# Patient Record
Sex: MALE | Race: WHITE | NOT HISPANIC OR LATINO
[De-identification: names, ages, dates, MRNs, and addresses within clinical notes are randomized per-mention and may not be internally consistent; named-entity substitution may affect disease eponyms.]

---

## 2017-01-18 ENCOUNTER — APPOINTMENT (OUTPATIENT)
Dept: CARDIOLOGY | Facility: CLINIC | Age: 77
End: 2017-01-18

## 2017-02-07 ENCOUNTER — APPOINTMENT (OUTPATIENT)
Dept: CARDIOLOGY | Facility: CLINIC | Age: 77
End: 2017-02-07

## 2017-04-12 ENCOUNTER — APPOINTMENT (OUTPATIENT)
Dept: CARDIOLOGY | Facility: CLINIC | Age: 77
End: 2017-04-12

## 2017-04-19 ENCOUNTER — APPOINTMENT (OUTPATIENT)
Dept: CARDIOLOGY | Facility: CLINIC | Age: 77
End: 2017-04-19

## 2017-04-24 ENCOUNTER — APPOINTMENT (OUTPATIENT)
Dept: CARDIOLOGY | Facility: CLINIC | Age: 77
End: 2017-04-24

## 2017-05-08 ENCOUNTER — APPOINTMENT (OUTPATIENT)
Dept: CARDIOLOGY | Facility: CLINIC | Age: 77
End: 2017-05-08

## 2017-05-09 ENCOUNTER — APPOINTMENT (OUTPATIENT)
Dept: CARDIOLOGY | Facility: CLINIC | Age: 77
End: 2017-05-09

## 2017-08-14 ENCOUNTER — APPOINTMENT (OUTPATIENT)
Dept: CARDIOLOGY | Facility: CLINIC | Age: 77
End: 2017-08-14
Payer: MEDICARE

## 2017-08-14 PROCEDURE — 93289 INTERROG DEVICE EVAL HEART: CPT

## 2017-09-28 ENCOUNTER — APPOINTMENT (OUTPATIENT)
Dept: CARDIOLOGY | Facility: CLINIC | Age: 77
End: 2017-09-28
Payer: MEDICARE

## 2017-09-28 PROCEDURE — 93880 EXTRACRANIAL BILAT STUDY: CPT

## 2017-09-28 PROCEDURE — 93306 TTE W/DOPPLER COMPLETE: CPT

## 2017-10-10 ENCOUNTER — APPOINTMENT (OUTPATIENT)
Dept: CARDIOLOGY | Facility: CLINIC | Age: 77
End: 2017-10-10
Payer: MEDICARE

## 2017-10-10 ENCOUNTER — RECORD ABSTRACTING (OUTPATIENT)
Age: 77
End: 2017-10-10

## 2017-10-10 DIAGNOSIS — Z78.9 OTHER SPECIFIED HEALTH STATUS: ICD-10-CM

## 2017-10-10 DIAGNOSIS — Z81.8 FAMILY HISTORY OF OTHER MENTAL AND BEHAVIORAL DISORDERS: ICD-10-CM

## 2017-10-10 DIAGNOSIS — Z86.19 PERSONAL HISTORY OF OTHER INFECTIOUS AND PARASITIC DISEASES: ICD-10-CM

## 2017-10-10 DIAGNOSIS — Z82.49 FAMILY HISTORY OF ISCHEMIC HEART DISEASE AND OTHER DISEASES OF THE CIRCULATORY SYSTEM: ICD-10-CM

## 2017-10-10 PROCEDURE — 93000 ELECTROCARDIOGRAM COMPLETE: CPT

## 2017-10-10 PROCEDURE — 99214 OFFICE O/P EST MOD 30 MIN: CPT

## 2017-10-10 RX ORDER — DIPHENHYDRAMINE HCL 25 MG
25 CAPSULE ORAL
Refills: 0 | Status: ACTIVE | COMMUNITY

## 2017-10-10 RX ORDER — ATORVASTATIN CALCIUM 40 MG/1
40 TABLET, FILM COATED ORAL DAILY
Refills: 0 | Status: ACTIVE | COMMUNITY

## 2017-10-10 RX ORDER — ASPIRIN ENTERIC COATED TABLETS 81 MG 81 MG/1
81 TABLET, DELAYED RELEASE ORAL DAILY
Refills: 0 | Status: ACTIVE | COMMUNITY

## 2017-10-10 RX ORDER — CARVEDILOL 3.12 MG/1
3.12 TABLET, FILM COATED ORAL TWICE DAILY
Refills: 0 | Status: ACTIVE | COMMUNITY

## 2017-11-16 ENCOUNTER — APPOINTMENT (OUTPATIENT)
Dept: CARDIOLOGY | Facility: CLINIC | Age: 77
End: 2017-11-16
Payer: MEDICARE

## 2017-11-16 PROCEDURE — 93282 PRGRMG EVAL IMPLANTABLE DFB: CPT

## 2018-03-12 ENCOUNTER — APPOINTMENT (OUTPATIENT)
Dept: CARDIOLOGY | Facility: CLINIC | Age: 78
End: 2018-03-12
Payer: MEDICARE

## 2018-03-12 PROCEDURE — 93282 PRGRMG EVAL IMPLANTABLE DFB: CPT

## 2018-05-24 ENCOUNTER — RECORD ABSTRACTING (OUTPATIENT)
Age: 78
End: 2018-05-24

## 2018-05-24 RX ORDER — BUDESONIDE AND FORMOTEROL FUMARATE DIHYDRATE 160; 4.5 UG/1; UG/1
160-4.5 AEROSOL RESPIRATORY (INHALATION)
Refills: 0 | Status: COMPLETED | COMMUNITY

## 2018-05-24 RX ORDER — ALBUTEROL 90 MCG
90 AEROSOL (GRAM) INHALATION
Refills: 0 | Status: COMPLETED | COMMUNITY

## 2018-06-12 ENCOUNTER — RECORD ABSTRACTING (OUTPATIENT)
Age: 78
End: 2018-06-12

## 2018-06-12 ENCOUNTER — APPOINTMENT (OUTPATIENT)
Dept: CARDIOLOGY | Facility: CLINIC | Age: 78
End: 2018-06-12
Payer: MEDICARE

## 2018-06-12 VITALS
WEIGHT: 142 LBS | HEIGHT: 70 IN | SYSTOLIC BLOOD PRESSURE: 120 MMHG | OXYGEN SATURATION: 96 % | HEART RATE: 82 BPM | DIASTOLIC BLOOD PRESSURE: 64 MMHG | BODY MASS INDEX: 20.33 KG/M2

## 2018-06-12 PROCEDURE — 99214 OFFICE O/P EST MOD 30 MIN: CPT

## 2018-06-19 ENCOUNTER — APPOINTMENT (OUTPATIENT)
Dept: CARDIOLOGY | Facility: CLINIC | Age: 78
End: 2018-06-19
Payer: MEDICARE

## 2018-06-19 PROCEDURE — 93282 PRGRMG EVAL IMPLANTABLE DFB: CPT

## 2018-09-25 ENCOUNTER — APPOINTMENT (OUTPATIENT)
Dept: CARDIOLOGY | Facility: CLINIC | Age: 78
End: 2018-09-25
Payer: MEDICARE

## 2018-09-25 PROCEDURE — 93282 PRGRMG EVAL IMPLANTABLE DFB: CPT

## 2018-10-11 ENCOUNTER — APPOINTMENT (OUTPATIENT)
Dept: CARDIOLOGY | Facility: CLINIC | Age: 78
End: 2018-10-11
Payer: MEDICARE

## 2018-10-11 PROCEDURE — 93880 EXTRACRANIAL BILAT STUDY: CPT

## 2018-10-11 PROCEDURE — 93306 TTE W/DOPPLER COMPLETE: CPT

## 2018-10-19 ENCOUNTER — RECORD ABSTRACTING (OUTPATIENT)
Age: 78
End: 2018-10-19

## 2018-10-23 ENCOUNTER — APPOINTMENT (OUTPATIENT)
Dept: CARDIOLOGY | Facility: CLINIC | Age: 78
End: 2018-10-23
Payer: MEDICARE

## 2018-10-23 VITALS
HEIGHT: 70 IN | WEIGHT: 142 LBS | BODY MASS INDEX: 20.33 KG/M2 | DIASTOLIC BLOOD PRESSURE: 70 MMHG | HEART RATE: 90 BPM | SYSTOLIC BLOOD PRESSURE: 130 MMHG | OXYGEN SATURATION: 97 %

## 2018-10-23 DIAGNOSIS — Z00.00 ENCOUNTER FOR GENERAL ADULT MEDICAL EXAMINATION W/OUT ABNORMAL FINDINGS: ICD-10-CM

## 2018-10-23 PROCEDURE — 93000 ELECTROCARDIOGRAM COMPLETE: CPT

## 2018-10-23 PROCEDURE — 99214 OFFICE O/P EST MOD 30 MIN: CPT

## 2019-01-08 ENCOUNTER — APPOINTMENT (OUTPATIENT)
Dept: CARDIOLOGY | Facility: CLINIC | Age: 79
End: 2019-01-08
Payer: MEDICARE

## 2019-01-08 PROCEDURE — 93282 PRGRMG EVAL IMPLANTABLE DFB: CPT

## 2019-01-08 NOTE — PROCEDURE
[No] : not [ICD] : Implantable cardioverter-defibrillator [VVI] : VVI [Voltage: ___ volts] : Voltage was [unfilled] volts [Charge Time: ___ sec] : charge time was [unfilled] seconds [Threshold Testing Performed] : Threshold testing was performed [Lead Imp:  ___ohms] : lead impedance was [unfilled] ohms [Sensing Amplitude ___mv] : sensing amplitude was [unfilled] mv [___V @] : [unfilled] V [___ ms] : [unfilled] ms [None] : none [de-identified] : Medtronic [de-identified] : Donte GREEN VR [id number] [de-identified] : [id number] [de-identified] : 2- [de-identified] : 40 [de-identified] :  <0.1%\par \par Settings\par ZONES:\par VF: 182bpm   Therapy:  aTP with charge, 35J x 6\par Fast VT: 240bpm   Therapy: Bust (1), 35J x 5\par VT 167bpm  Therapy: monitor\par \par Ventricle sensing 0.30mv\par                amplitude 2.0v (auto)\par                 duration 0.40ms (auto\par \par no events were noted\par \par f/u AICD interrogation due in 3 months\par \par Red flag symptoms which would warrant sooner emergent evaluation reviewed with the patient. \par Questions and concerns were addressed and answered. \par \par Sincerely,\par \par Justyna Montoya PA-C\par Supervising MD: Vilma Galindo

## 2019-03-18 ENCOUNTER — RECORD ABSTRACTING (OUTPATIENT)
Age: 79
End: 2019-03-18

## 2019-03-26 ENCOUNTER — APPOINTMENT (OUTPATIENT)
Dept: CARDIOLOGY | Facility: CLINIC | Age: 79
End: 2019-03-26
Payer: MEDICARE

## 2019-03-26 VITALS
DIASTOLIC BLOOD PRESSURE: 70 MMHG | WEIGHT: 144 LBS | BODY MASS INDEX: 20.62 KG/M2 | OXYGEN SATURATION: 98 % | HEART RATE: 69 BPM | SYSTOLIC BLOOD PRESSURE: 110 MMHG | HEIGHT: 70 IN

## 2019-03-26 PROCEDURE — 99214 OFFICE O/P EST MOD 30 MIN: CPT

## 2019-04-09 ENCOUNTER — APPOINTMENT (OUTPATIENT)
Dept: CARDIOLOGY | Facility: CLINIC | Age: 79
End: 2019-04-09
Payer: MEDICARE

## 2019-04-09 VITALS
WEIGHT: 142 LBS | DIASTOLIC BLOOD PRESSURE: 74 MMHG | BODY MASS INDEX: 19.88 KG/M2 | SYSTOLIC BLOOD PRESSURE: 122 MMHG | OXYGEN SATURATION: 97 % | HEART RATE: 73 BPM | HEIGHT: 71 IN

## 2019-04-09 PROCEDURE — 93282 PRGRMG EVAL IMPLANTABLE DFB: CPT

## 2019-04-09 NOTE — PROCEDURE
[No] : not [ICD] : Implantable cardioverter-defibrillator [VVI] : VVI [Voltage: ___ volts] : Voltage was [unfilled] volts [Charge Time: ___ sec] : charge time was [unfilled] seconds [Threshold Testing Performed] : Threshold testing was performed [Lead Imp:  ___ohms] : lead impedance was [unfilled] ohms [Sensing Amplitude ___mv] : sensing amplitude was [unfilled] mv [___V @] : [unfilled] V [___ ms] : [unfilled] ms [None] : none [de-identified] : [id number] [de-identified] : Medtronic [de-identified] : Donte GREEN VR [id number] [de-identified] : 40 [de-identified] : 2- [de-identified] :  <0.1%\par \par Settings\par ZONES:\par VF: 182bpm   Therapy:  aTP with charge, 35J x 6\par Fast VT: 240bpm   Therapy: Bust (1), 35J x 5\par VT 167bpm  Therapy: monitor\par \par Ventricle sensing 0.30mv\par                amplitude 2.0v (auto)\par                 duration 0.40ms (auto\par \par 1 HVR, short duration, on Coreg 3.125 BID (previously taking QD), EF 45-50% October 2018\par Labs March 2019\par \par f/u AICD interrogation due in 3 months\par \par Red flag symptoms which would warrant sooner emergent evaluation reviewed with the patient. \par Questions and concerns were addressed and answered. \par \par Sincerely,\par \par Justyna Montoya PA-C\par Supervising MD: Vilma Galindo

## 2019-07-09 ENCOUNTER — APPOINTMENT (OUTPATIENT)
Dept: CARDIOLOGY | Facility: CLINIC | Age: 79
End: 2019-07-09
Payer: MEDICARE

## 2019-07-09 PROCEDURE — 93282 PRGRMG EVAL IMPLANTABLE DFB: CPT

## 2019-07-09 NOTE — PROCEDURE
[No] : not [ICD] : Implantable cardioverter-defibrillator [VVI] : VVI [Voltage: ___ volts] : Voltage was [unfilled] volts [Charge Time: ___ sec] : charge time was [unfilled] seconds [Lead Imp:  ___ohms] : lead impedance was [unfilled] ohms [Threshold Testing Performed] : Threshold testing was performed [Sensing Amplitude ___mv] : sensing amplitude was [unfilled] mv [___V @] : [unfilled] V [___ ms] : [unfilled] ms [None] : none [de-identified] : Medtronic [de-identified] : Donte GREEN VR [id number] [de-identified] : [id number] [de-identified] : 2- [de-identified] : 40 [de-identified] :  <0.1%\par \par Settings\par ZONES:\par VF: 182bpm   Therapy:  aTP with charge, 35J x 6\par Fast VT: 240bpm   Therapy: Bust (1), 35J x 5\par VT 167bpm  Therapy: monitor\par \par Ventricle sensing 0.30mv\par                amplitude 2.0v (auto)\par                 duration 0.40ms (auto\par \par \par f/u AICD interrogation due in 3 months\par \par Red flag symptoms which would warrant sooner emergent evaluation reviewed with the patient. \par Questions and concerns were addressed and answered. \par \par Sincerely,\par \par Justyna Montoya PA-C\par Supervising MD: Vilma Galindo

## 2019-10-08 ENCOUNTER — APPOINTMENT (OUTPATIENT)
Dept: CARDIOLOGY | Facility: CLINIC | Age: 79
End: 2019-10-08
Payer: MEDICARE

## 2019-10-08 PROCEDURE — 93282 PRGRMG EVAL IMPLANTABLE DFB: CPT

## 2019-10-08 NOTE — PROCEDURE
[No] : not [ICD] : Implantable cardioverter-defibrillator [VVI] : VVI [Voltage: ___ volts] : Voltage was [unfilled] volts [Charge Time: ___ sec] : charge time was [unfilled] seconds [Lead Imp:  ___ohms] : lead impedance was [unfilled] ohms [Threshold Testing Performed] : Threshold testing was performed [Sensing Amplitude ___mv] : sensing amplitude was [unfilled] mv [___V @] : [unfilled] V [None] : none [___ ms] : [unfilled] ms [de-identified] : Medtronic [de-identified] : Donte GREEN VR [id number] [de-identified] : [id number] [de-identified] : 2- [de-identified] : 40 [de-identified] :  <0.1%\par \par Settings\par ZONES:\par VF: 182bpm   Therapy:  aTP with charge, 35J x 6\par Fast VT: 240bpm   Therapy: Bust (1), 35J x 5\par VT 167bpm  Therapy: monitor\par \par Ventricle sensing 0.30mv\par                amplitude 2.0v (auto)\par                 duration 0.40ms (auto\par \par \par f/u AICD interrogation due in 3 months\par \par Red flag symptoms which would warrant sooner emergent evaluation reviewed with the patient. \par Questions and concerns were addressed and answered. \par \par Sincerely,\par \par Justyna Montoya PA-C\par Supervising MD: Vilma Galindo

## 2019-10-10 ENCOUNTER — NON-APPOINTMENT (OUTPATIENT)
Age: 79
End: 2019-10-10

## 2019-10-10 ENCOUNTER — APPOINTMENT (OUTPATIENT)
Dept: CARDIOLOGY | Facility: CLINIC | Age: 79
End: 2019-10-10
Payer: MEDICARE

## 2019-10-10 VITALS
HEIGHT: 71 IN | HEART RATE: 73 BPM | SYSTOLIC BLOOD PRESSURE: 112 MMHG | WEIGHT: 136 LBS | BODY MASS INDEX: 19.04 KG/M2 | OXYGEN SATURATION: 97 % | DIASTOLIC BLOOD PRESSURE: 74 MMHG

## 2019-10-10 DIAGNOSIS — I42.5 OTHER RESTRICTIVE CARDIOMYOPATHY: ICD-10-CM

## 2019-10-10 DIAGNOSIS — I21.09 ST ELEVATION (STEMI) MYOCARDIAL INFARCTION INVOLVING OTHER CORONARY ARTERY OF ANTERIOR WALL: ICD-10-CM

## 2019-10-10 PROCEDURE — 99214 OFFICE O/P EST MOD 30 MIN: CPT

## 2019-10-10 PROCEDURE — 93000 ELECTROCARDIOGRAM COMPLETE: CPT

## 2019-10-10 NOTE — PHYSICAL EXAM
[Normal Appearance] : normal appearance [No Deformities] : no deformities [Eyelids - No Xanthelasma] : the eyelids demonstrated no xanthelasmas [No Oral Pallor] : no oral pallor [Normal Jugular Venous V Waves Present] : normal jugular venous V waves present [Respiration, Rhythm And Depth] : normal respiratory rhythm and effort [Heart Rate And Rhythm] : heart rate and rhythm were normal [Heart Sounds] : normal S1 and S2 [Edema] : no peripheral edema present [Bowel Sounds] : normal bowel sounds [Abdomen Soft] : soft [Abdomen Tenderness] : non-tender [Abnormal Walk] : normal gait [Petechial Hemorrhages (___cm)] : no petechial hemorrhages [] : no rash [Oriented To Time, Place, And Person] : oriented to person, place, and time

## 2019-10-10 NOTE — DISCUSSION/SUMMARY
[FreeTextEntry1] : CAD: Currently stable on medications. Patient on statin, aspirin and beta blockers.\par \par Cardiomyopathy, probably ischemic. Borderline low LVEF. This has been stable. No CHF signs or symptoms. Due to high creatinine, patient unable to take ACE I or ARB. Sometimes, he has postural dizziness. Borderline blood pressures for his age. Unable to give hydralazine. Followup LVEF and PASP in future.\par \par Screen for AAA Given his age and history of CAD. He denies claudication\par \par Status post AICD. Continue device check. No episodes of PAF or significant ventricular arrhythmias\par \par Hypercholesteremia, well controlled with statins\par \par Patient encouraged to stay active, CT low-level exercise. Avoid high sodium diet\par \par lexiscan stress test with SPECT perfusion imaging in future should be considered. The patient is reluctant for this.\par \par \par Sincerely,\par Arnulfo Cobb MD, FACC, ALEXANDRA

## 2019-10-10 NOTE — HISTORY OF PRESENT ILLNESS
[FreeTextEntry1] : Stu is a pleasant 79-year-old male with history of cardiomyopathy, LVEF 45-50% on echocardiogram in October 2018, CAD, status post STEMI in 2010 and another MARISOL to LAD and PTCA of D1 in  2013, status post AICD at [place]\par \par Also hypercholesteremia, Hypertension, improved with medications\par \par he denies PND, orthopnea. He has much shortness of breath on exertion which has been stable over the past few years. No CHF exacerbation her hospital admission. No TIA, CVA or syncope. He denies chest pain.\par \par

## 2020-05-11 ENCOUNTER — APPOINTMENT (OUTPATIENT)
Dept: CARDIOLOGY | Facility: CLINIC | Age: 80
End: 2020-05-11

## 2020-07-20 ENCOUNTER — APPOINTMENT (OUTPATIENT)
Dept: CARDIOLOGY | Facility: CLINIC | Age: 80
End: 2020-07-20
Payer: MEDICARE

## 2020-07-20 PROCEDURE — 93979 VASCULAR STUDY: CPT

## 2020-07-20 PROCEDURE — 93282 PRGRMG EVAL IMPLANTABLE DFB: CPT

## 2020-07-20 PROCEDURE — 93306 TTE W/DOPPLER COMPLETE: CPT

## 2020-07-21 NOTE — PROCEDURE
[No] : not [See Scanned Paceart Report] : See scanned paceart report [ICD] : Implantable cardioverter-defibrillator [Voltage: ___ volts] : Voltage was [unfilled] volts [VVI] : VVI [Lead Imp:  ___ohms] : lead impedance was [unfilled] ohms [Threshold Testing Performed] : Threshold testing was performed [Charge Time: ___ sec] : charge time was [unfilled] seconds [___V @] : [unfilled] V [Sensing Amplitude ___mv] : sensing amplitude was [unfilled] mv [___ ms] : [unfilled] ms [None] : none [de-identified] : Medtronic [de-identified] : Donte GREEN VR [id number] [de-identified] : [id number] [de-identified] : 2- [de-identified] : 40 [de-identified] :  <0.1%\par \par Settings\par \par \par Ventricle sensing 0.30mv\par                amplitude 2.0v (auto)\par                 duration 0.40ms (auto\par \par no events were noted\par \par f/u AICD interrogation due in 3 months\par \par Red flag symptoms which would warrant sooner emergent evaluation reviewed with the patient. \par Questions and concerns were addressed and answered. \par \par \par Sincerely,\par \par Justyna Montoya PA-C\par Supervising MD: Vilma Galindo

## 2020-10-18 ENCOUNTER — RESULT CHARGE (OUTPATIENT)
Age: 80
End: 2020-10-18

## 2020-10-19 ENCOUNTER — APPOINTMENT (OUTPATIENT)
Dept: CARDIOLOGY | Facility: CLINIC | Age: 80
End: 2020-10-19
Payer: MEDICARE

## 2020-10-19 VITALS
DIASTOLIC BLOOD PRESSURE: 74 MMHG | HEIGHT: 71 IN | HEART RATE: 71 BPM | SYSTOLIC BLOOD PRESSURE: 130 MMHG | BODY MASS INDEX: 19.04 KG/M2 | TEMPERATURE: 98.7 F | WEIGHT: 136 LBS | OXYGEN SATURATION: 98 %

## 2020-10-19 PROCEDURE — 99214 OFFICE O/P EST MOD 30 MIN: CPT

## 2020-10-19 PROCEDURE — 93282 PRGRMG EVAL IMPLANTABLE DFB: CPT

## 2020-10-19 RX ORDER — ALBUTEROL SULFATE 90 UG/1
108 (90 BASE) INHALANT RESPIRATORY (INHALATION)
Qty: 8 | Refills: 0 | Status: ACTIVE | COMMUNITY
Start: 2020-09-22

## 2020-10-19 NOTE — DISCUSSION/SUMMARY
[FreeTextEntry1] : CAD: Currently stable on medications. Patient on statin, aspirin and beta blockers.\par \par Cardiomyopathy -VF has improved. No CHF signs or symptoms. Due to high creatinine, patient unable to take ACE I or ARB. Sometimes, he has postural dizziness.  Trial of low-dose hydralazine can be attempted the LVEF is less than 50%\par \par Status post AICD. Continue device check. No episodes of PAF or significant ventricular arrhythmias\par \par Hypercholesteremia, well controlled with statins\par \par Patient encouraged to stay active, CT low-level exercise. Avoid high sodium diet\par \par lexiscan stress test with SPECT perfusion imaging in future should be considered. The patient is reluctant for this.\par \par Follow-up AICD as per routine protocol.  Follow-up in the office with me in 1 year.\par \par \par Sincerely,\par Arnulfo Cobb MD, FACC, ALEXANDRA

## 2020-10-19 NOTE — PROCEDURE
[No] : not [See Scanned Paceart Report] : See scanned paceart report [VVI] : VVI [ICD] : Implantable cardioverter-defibrillator [Voltage: ___ volts] : Voltage was [unfilled] volts [Threshold Testing Performed] : Threshold testing was performed [Charge Time: ___ sec] : charge time was [unfilled] seconds [Sensing Amplitude ___mv] : sensing amplitude was [unfilled] mv [Lead Imp:  ___ohms] : lead impedance was [unfilled] ohms [___V @] : [unfilled] V [___ ms] : [unfilled] ms [None] : none [de-identified] : Medtronic [de-identified] : Donte GREEN VR [id number] [de-identified] : [id number] [de-identified] : 2- [de-identified] : 40 [de-identified] :  <0.1%\par \par Settings\par \par \par Ventricle sensing 0.30mv\par                amplitude 2.0v (auto)\par                 duration 0.40ms (auto\par \par no events were noted\par \par f/u AICD interrogation due in 3 months\par \par Red flag symptoms which would warrant sooner emergent evaluation reviewed with the patient. \par Questions and concerns were addressed and answered. \par \par \par Sincerely,\par \par Justyna Montoya PA-C\par Supervising MD: Arnulfo Cobb

## 2020-10-19 NOTE — PHYSICAL EXAM
[Normal Appearance] : normal appearance [No Deformities] : no deformities [Eyelids - No Xanthelasma] : the eyelids demonstrated no xanthelasmas [No Oral Pallor] : no oral pallor [Normal Jugular Venous V Waves Present] : normal jugular venous V waves present [Respiration, Rhythm And Depth] : normal respiratory rhythm and effort [Heart Rate And Rhythm] : heart rate and rhythm were normal [FreeTextEntry1] : 2/6 systolic ejection murmur [Heart Sounds] : normal S1 and S2 [Edema] : no peripheral edema present [Bowel Sounds] : normal bowel sounds [Abdomen Soft] : soft [Abdomen Tenderness] : non-tender [Petechial Hemorrhages (___cm)] : no petechial hemorrhages [Abnormal Walk] : normal gait [] : no rash [Oriented To Time, Place, And Person] : oriented to person, place, and time

## 2020-10-19 NOTE — HISTORY OF PRESENT ILLNESS
[FreeTextEntry1] : Stu is a pleasant 79-year-old male with history of cardiomyopathy, LVEF 45-50% on echocardiogram in October 2018, CAD, status post STEMI in 2010 and another MARISOL to LAD and PTCA of D1 in  2013, status post AICD at [place]\par \par Also hypercholesteremia, Hypertension, improved with medications\par \par he denies PND, orthopnea. He has much shortness of breath on exertion which has been stable over the past few years. No CHF exacerbation her hospital admission. No TIA, CVA or syncope. He denies chest pain.\par \par His echocardiogram in July 2020 showed normal EF, mild aortic stenosis.  Normal PASP.

## 2021-04-05 ENCOUNTER — APPOINTMENT (OUTPATIENT)
Dept: CARDIOLOGY | Facility: CLINIC | Age: 81
End: 2021-04-05

## 2021-04-16 ENCOUNTER — APPOINTMENT (OUTPATIENT)
Dept: CARDIOLOGY | Facility: CLINIC | Age: 81
End: 2021-04-16
Payer: MEDICARE

## 2021-04-16 ENCOUNTER — NON-APPOINTMENT (OUTPATIENT)
Age: 81
End: 2021-04-16

## 2021-04-16 PROCEDURE — 93282 PRGRMG EVAL IMPLANTABLE DFB: CPT

## 2021-04-16 NOTE — PROCEDURE
[No] : not [ICD] : Implantable cardioverter-defibrillator [VVI] : VVI [Voltage: ___ volts] : Voltage was [unfilled] volts [Lead Imp:  ___ohms] : lead impedance was [unfilled] ohms [Sensing Amplitude ___mv] : sensing amplitude was [unfilled] mv [___V @] : [unfilled] V [___ ms] : [unfilled] ms [de-identified] : Medtronic [de-identified] : Donte GREEN VR [id number] [de-identified] : [id number] [de-identified] : 2- [de-identified] : 40 [de-identified] : B [de-identified] : VS 99.9%\par  0.1%\par \par No events.\par \par \par Settings\par \par \par Ventricle sensing 0.30mv\par  amplitude 2.0v (auto)\par  duration 0.40ms (auto)\par \par Detection:\par VF, On, rate 182 bpm, Therapies ATP during charging, 35J x 6\par FVT, via VF, rate 240 bpm, Therapies burse (1), 35J x 6\par VT, off, rate 167 bpm, All Rx off\par \par Patient will hit RRT at 2.63 volts. Close monitoring recommended. Declines remote monitoring. Will repeat DVC in 3 months.\par \par F/U: as above.\par Discussed red flag symptoms, which would warrant sooner or emergent medical evaluation.\par Any questions and concerns were addressed and resolved.\par \par Sincerely,\par Hannah Davidson FNP-BC\par Patient's history, testing, and plan was reviewed with supervising physician, Dr. Arnulfo Cobb

## 2021-07-09 ENCOUNTER — APPOINTMENT (OUTPATIENT)
Dept: CARDIOLOGY | Facility: CLINIC | Age: 81
End: 2021-07-09
Payer: MEDICARE

## 2021-07-09 VITALS
SYSTOLIC BLOOD PRESSURE: 124 MMHG | BODY MASS INDEX: 19.82 KG/M2 | WEIGHT: 140 LBS | OXYGEN SATURATION: 97 % | DIASTOLIC BLOOD PRESSURE: 68 MMHG | HEART RATE: 65 BPM | HEIGHT: 70.5 IN | TEMPERATURE: 97.8 F

## 2021-07-09 PROCEDURE — 99214 OFFICE O/P EST MOD 30 MIN: CPT

## 2021-07-09 NOTE — PROCEDURE
[No] : not [ICD] : Implantable cardioverter-defibrillator [VVI] : VVI [___ ms] : [unfilled] ms [Voltage: ___ volts] : Voltage was [unfilled] volts [Charge Time: ___ sec] : charge time was [unfilled] seconds [Lead Imp:  ___ohms] : lead impedance was [unfilled] ohms [Sensing Amplitude ___mv] : sensing amplitude was [unfilled] mv [___V @] : [unfilled] V [de-identified] : Medtronic [de-identified] : Donte GREEN VR [id number] [de-identified] : [id number] [de-identified] : 2- [de-identified] : 40 [de-identified] : %\par  <0.1%\par \par No events.\par \par \par Settings\par \par \par Ventricle sensing 0.30mv\par  amplitude 2.0v (auto)\par  duration 0.40ms (auto)\par \par Detection:\par VF, On, rate 182 bpm, Therapies ATP during charging, 35J x 6\par FVT, via VF, rate 240 bpm, Therapies burst (1), 35J x 5\par VT, off, rate 167 bpm, All Rx off\par \par Patient will hit RRT at 2.63 volts. Close monitoring recommended. Declines remote monitoring. Will repeat DVC in 3 months.\par \par Patient has OV today as well.\par \par Device and lead have been confirmed in Paceart 7/9/21.\par \par F/U: as above.\par Discussed red flag symptoms, which would warrant sooner or emergent medical evaluation.\par Any questions and concerns were addressed and resolved.\par \par Sincerely,\par Hannah Davidson [facility]-BC\par Patient's history, testing, and plan was reviewed with supervising physician, Dr. Arnulfo Cobb

## 2021-07-09 NOTE — PHYSICAL EXAM
[Well Developed] : well developed [Well Nourished] : well nourished [No Acute Distress] : no acute distress [Normal Conjunctiva] : normal conjunctiva [Normal Venous Pressure] : normal venous pressure [No Carotid Bruit] : no carotid bruit [Normal S1, S2] : normal S1, S2 [No Murmur] : no murmur [No Rub] : no rub [No Gallop] : no gallop [Clear Lung Fields] : clear lung fields [Good Air Entry] : good air entry [No Respiratory Distress] : no respiratory distress  [Soft] : abdomen soft [Non Tender] : non-tender [No Masses/organomegaly] : no masses/organomegaly [Normal Bowel Sounds] : normal bowel sounds [Normal Gait] : normal gait [No Edema] : no edema [No Cyanosis] : no cyanosis [No Clubbing] : no clubbing [No Varicosities] : no varicosities [No Rash] : no rash [No Skin Lesions] : no skin lesions [Moves all extremities] : moves all extremities [No Focal Deficits] : no focal deficits [Normal Speech] : normal speech [Alert and Oriented] : alert and oriented [Normal memory] : normal memory [Normal Appearance] : normal appearance [No Deformities] : no deformities [Eyelids - No Xanthelasma] : the eyelids demonstrated no xanthelasmas [No Oral Pallor] : no oral pallor [Normal Jugular Venous V Waves Present] : normal jugular venous V waves present [Respiration, Rhythm And Depth] : normal respiratory rhythm and effort [Heart Rate And Rhythm] : heart rate and rhythm were normal [Heart Sounds] : normal S1 and S2 [Edema] : no peripheral edema present [Bowel Sounds] : normal bowel sounds [Abdomen Soft] : soft [Abdomen Tenderness] : non-tender [Abnormal Walk] : normal gait [Petechial Hemorrhages (___cm)] : no petechial hemorrhages [] : no rash [Oriented To Time, Place, And Person] : oriented to person, place, and time [FreeTextEntry1] : 2/6 systolic ejection murmur

## 2021-07-09 NOTE — HISTORY OF PRESENT ILLNESS
[FreeTextEntry1] : ERNESTINE CHAVEZ is a 80 year old male with a past medical history of cardiomyopathy, LVEF 45-50% on echocardiogram in October 2018, CAD, status post STEMI in 2010 and another MARISOL to LAD and PTCA of D1 in  2013, status post AICD at [place], hypercholesteremia, Hypertension.\par \par \par Last seen 10/19/20. In the interim there have been no hospitalizations or procedures. He denies chest pain, pressure, palpitations, unusual shortness of breath, orthopnea, LE edema, lightheadedness, dizziness, near syncope or syncope. Never a smoker. Not on a formal exercise regimen.\par \par Testing:\par \par \par \par Labs 10/21/20: WBC 5.1, Hgb 13.4, HCT 40.5, plt 155, Na 142, K 5.3, Cr 1.64, Ca 9.2, AST 28, ALT 24, CK 54\par \par EKG 10/19/20: SR at 67 bpm with LAD, LAFB, Q waves lad III, aVF, V1, and V2, PRWP, nonspecific ST-T wave abnormalities, KS interval 176 ms, Qtc 412 ms \par \par Abd us 7/20/20: Mild plaque. No AAA.\par \par His echocardiogram in July 2020 showed normal EF, mild aortic stenosis.  Normal PASP.\par \par Carotids 10/11/18: Mild nonobstructive carotid dz seen BL

## 2021-07-09 NOTE — DISCUSSION/SUMMARY
[FreeTextEntry1] : ERNESTINE CHAVEZ is a 80 year old M who presents today Jul 09, 2021 with the above history and the following active issues:\par \par \par CAD: Abnormal EKG. Currently stable on medications. Patient on statin, aspirin and beta blockers. Recommend updating ischemic eval with a pharm deja. Patient will not be able to walk on a treadmill.\par \par Cardiomyopathy -EF has improved. No CHF signs or symptoms. Due to high creatinine, patient unable to take ACE I or ARB. Prior complaints of postural dizziness.  Trial of low-dose hydralazine can be attempted the LVEF is less than 50%. Murmur on exam. Will update echo.\par \par Status post AICD. Continue device check. No episodes of PAF or significant ventricular arrhythmias\par \par Hypercholesteremia, well controlled with statins\par \par Patient encouraged to stay active, CT low-level exercise. Avoid high sodium diet\par \par Ongoing DVC checks. Close monitoring of battery.\par \par Fasting b/w ordered.\par \par F/U after testing to review results (echo, nuke, labs).\par Discussed red flag symptoms, which would warrant sooner or emergent medical evaluation.\par Any questions and concerns were addressed and resolved.\par \par Sincerely,\par Hannah GRANT-BC\par Patient's history, testing, and plan was reviewed with supervising physician, Dr. Arnulfo Cobb\par \par

## 2021-07-09 NOTE — PROCEDURE
[No] : not [ICD] : Implantable cardioverter-defibrillator [VVI] : VVI [___ ms] : [unfilled] ms [Voltage: ___ volts] : Voltage was [unfilled] volts [Charge Time: ___ sec] : charge time was [unfilled] seconds [Lead Imp:  ___ohms] : lead impedance was [unfilled] ohms [Sensing Amplitude ___mv] : sensing amplitude was [unfilled] mv [___V @] : [unfilled] V [de-identified] : Medtronic [de-identified] : Donte GREEN VR [id number] [de-identified] : [id number] [de-identified] : 2- [de-identified] : 40 [de-identified] : %\par  <0.1%\par \par No events.\par \par \par Settings\par \par \par Ventricle sensing 0.30mv\par  amplitude 2.0v (auto)\par  duration 0.40ms (auto)\par \par Detection:\par VF, On, rate 182 bpm, Therapies ATP during charging, 35J x 6\par FVT, via VF, rate 240 bpm, Therapies burst (1), 35J x 5\par VT, off, rate 167 bpm, All Rx off\par \par Patient will hit RRT at 2.63 volts. Close monitoring recommended. Declines remote monitoring. Will repeat DVC in 3 months.\par \par Patient has OV today as well.\par \par Device and lead have been confirmed in Paceart 7/9/21.\par \par F/U: as above.\par Discussed red flag symptoms, which would warrant sooner or emergent medical evaluation.\par Any questions and concerns were addressed and resolved.\par \par Sincerely,\par Hannah Davidson [facility]-BC\par Patient's history, testing, and plan was reviewed with supervising physician, Dr. Arnulfo Cobb

## 2021-07-09 NOTE — HISTORY OF PRESENT ILLNESS
[FreeTextEntry1] : ERNESTINE CHAVEZ is a 80 year old male with a past medical history of cardiomyopathy, LVEF 45-50% on echocardiogram in October 2018, CAD, status post STEMI in 2010 and another MARISOL to LAD and PTCA of D1 in  2013, status post AICD at [place], hypercholesteremia, Hypertension.\par \par \par Last seen 10/19/20. In the interim there have been no hospitalizations or procedures. He denies chest pain, pressure, palpitations, unusual shortness of breath, orthopnea, LE edema, lightheadedness, dizziness, near syncope or syncope. Never a smoker. Not on a formal exercise regimen.\par \par Testing:\par \par \par \par Labs 10/21/20: WBC 5.1, Hgb 13.4, HCT 40.5, plt 155, Na 142, K 5.3, Cr 1.64, Ca 9.2, AST 28, ALT 24, CK 54\par \par EKG 10/19/20: SR at 67 bpm with LAD, LAFB, Q waves lad III, aVF, V1, and V2, PRWP, nonspecific ST-T wave abnormalities, CT interval 176 ms, Qtc 412 ms \par \par Abd us 7/20/20: Mild plaque. No AAA.\par \par His echocardiogram in July 2020 showed normal EF, mild aortic stenosis.  Normal PASP.\par \par Carotids 10/11/18: Mild nonobstructive carotid dz seen BL

## 2021-08-24 ENCOUNTER — APPOINTMENT (OUTPATIENT)
Dept: CARDIOLOGY | Facility: CLINIC | Age: 81
End: 2021-08-24
Payer: MEDICARE

## 2021-08-24 PROCEDURE — A9502: CPT

## 2021-08-24 PROCEDURE — 78452 HT MUSCLE IMAGE SPECT MULT: CPT

## 2021-08-24 PROCEDURE — 93306 TTE W/DOPPLER COMPLETE: CPT

## 2021-08-24 PROCEDURE — 93015 CV STRESS TEST SUPVJ I&R: CPT

## 2021-09-02 ENCOUNTER — APPOINTMENT (OUTPATIENT)
Dept: CARDIOLOGY | Facility: CLINIC | Age: 81
End: 2021-09-02
Payer: MEDICARE

## 2021-09-02 VITALS
DIASTOLIC BLOOD PRESSURE: 62 MMHG | HEIGHT: 70 IN | BODY MASS INDEX: 19.04 KG/M2 | HEART RATE: 66 BPM | WEIGHT: 133 LBS | SYSTOLIC BLOOD PRESSURE: 124 MMHG | TEMPERATURE: 98 F

## 2021-09-02 PROCEDURE — 99215 OFFICE O/P EST HI 40 MIN: CPT

## 2021-09-02 NOTE — DISCUSSION/SUMMARY
[FreeTextEntry1] : ERNESTINE CHAVEZ is a 81 year old M \par \par CAD: Abnormal EKG. Currently stable on medications. Patient on statin, aspirin and beta blockers.\par Stress test with SPECT showed ischemic cardiomyopathy, fixed defects, no ischemia.\par \par Cardiomyopathy -EF had improved and then declined on the last echo in August 2021 to now 35 to 40%. No CHF signs or symptoms. Due to high creatinine, patient unable to take ACE I or ARB. Prior complaints of postural dizziness.  Trial of low-dose hydralazine -recheck LVEF in 2-3 months\par \par Status post AICD. Continue device check. No episodes of PAF or significant ventricular arrhythmias.  Device nearing ULYSSES.  Plan already in place to check battery status in few months.\par \par Hypercholesteremia, well controlled with statins\par \par Patient encouraged to stay active, CT low-level exercise. Avoid high sodium diet\par \par Ongoing DVC checks. Close monitoring of battery.\par \par Discussed findings of echocardiogram and nuclear stress test.  Discussed implication of decreasing EF and history of ischemic cardiomyopathy.  Patient mostly asymptomatic for cardiomyopathy or CHF. \par \par  He has generalized weakness and is losing weight.  He will discuss investigation of weight loss with primary care if needed.  I have asked him to take protein supplements and increase calorie intake\par \par Patient has guarded prognosis.  Risk for major cardiovascular events.\par \par \par Thank you for this referral and allowing me to participate in the care of this patient.  If I can be of any further help or  if you have any questions, please do not hesitate to contact me\par \par \par Sincerely,\par \par Arnulfo Cobb MD, FACC, ALEXANDRA\par

## 2021-09-02 NOTE — HISTORY OF PRESENT ILLNESS
[FreeTextEntry1] : ERNESTINE CHAVEZ is a 81 year old male with a past medical history of cardiomyopathy, LVEF 35-40% on echocardiogram August 2021 (decrease from July 2020), CAD, status post STEMI in 2010 and another MARISOL to LAD and PTCA of D1 in  2013, status post AICD at [place], hypercholesteremia, Hypertension.\par \par \par Patient was last seen July 2021.  In the past year, there have been no hospitalizations or procedures. He denies chest pain, pressure, palpitations, unusual shortness of breath, orthopnea, LE edema, lightheadedness, dizziness, near syncope or syncope. Never a smoker. \par \par He has fatigue and generalized weakness.  He has lost 7 pounds in the past month (his weight fluctuates between 144 and 133 pounds over past few years)\par \par \par ** Testing:\par \par Labs 10/21/20: WBC 5.1, Hgb 13.4, HCT 40.5, plt 155, Na 142, K 5.3, Cr 1.64, Ca 9.2, AST 28, ALT 24, CK 54\par \par EKG 10/19/20: SR at 67 bpm with LAD, LAFB, Q waves lad III, aVF, V1, and V2, PRWP, nonspecific ST-T wave abnormalities, FL interval 176 ms, Qtc 412 ms \par \par Abd us 7/20/20: Mild plaque. No AAA.\par \par His echocardiogram in July 2020 showed normal EF, mild aortic stenosis.  Normal PASP.\par \par Carotids 10/11/18: Mild nonobstructive carotid dz seen BL\par \par Echocardiogram August 2021: EF 35 to 40%.  Mild aortic stenosis.  Grade 1 diastolic dysfunction.  PASP 23.\par \par Lexiscan stress test with SPECT August 2021: Ischemic cardiomyopathy.  Gated EF 31%.  Akinesis of anteroapical, apical and inferoapical walls.

## 2021-10-04 ENCOUNTER — APPOINTMENT (OUTPATIENT)
Dept: CARDIOLOGY | Facility: CLINIC | Age: 81
End: 2021-10-04
Payer: MEDICARE

## 2021-10-04 PROCEDURE — 93282 PRGRMG EVAL IMPLANTABLE DFB: CPT

## 2021-10-04 NOTE — PROCEDURE
[No] : not [ICD] : Implantable cardioverter-defibrillator [VVI] : VVI [Voltage: ___ volts] : Voltage was [unfilled] volts [Lead Imp:  ___ohms] : lead impedance was [unfilled] ohms [Sensing Amplitude ___mv] : sensing amplitude was [unfilled] mv [___V @] : [unfilled] V [___ ms] : [unfilled] ms [de-identified] : Medtronic [de-identified] : Donte GREEN VR [id number] [de-identified] : [id number] [de-identified] : 2- [de-identified] : 40 [de-identified] : VS 99.9%\par  0.1%\par \par No events.\par \par \par Settings\par Ventricle sensing 0.30mv\par  amplitude 2.0v (auto)\par  duration 0.40ms (auto)\par \par Detection:\par VF, On, rate 182 bpm, Therapies ATP during charging, 35J x 6\par FVT, via VF, rate 240 bpm, Therapies burse (1), 35J x 6\par VT, off, rate 167 bpm, All Rx off\par \par Patient will hit RRT at 2.63 volts. Close monitoring recommended. Declines remote monitoring. Will repeat DVC in 3 months.\par \par F/U: as above.\par Discussed red flag symptoms, which would warrant sooner or emergent medical evaluation.\par Any questions and concerns were addressed and resolved.\par \par Sincerely,\par \par Justyna Montoya PA-C\par Patients history, testing and plan reviewed with supervising MD: Dr. Arnulfo Cobb

## 2021-10-21 ENCOUNTER — APPOINTMENT (OUTPATIENT)
Dept: CARDIOLOGY | Facility: CLINIC | Age: 81
End: 2021-10-21
Payer: MEDICARE

## 2021-10-21 VITALS
HEIGHT: 70 IN | HEART RATE: 58 BPM | SYSTOLIC BLOOD PRESSURE: 132 MMHG | BODY MASS INDEX: 18.47 KG/M2 | OXYGEN SATURATION: 99 % | DIASTOLIC BLOOD PRESSURE: 68 MMHG | TEMPERATURE: 98.2 F | WEIGHT: 129 LBS

## 2021-10-21 PROCEDURE — 99214 OFFICE O/P EST MOD 30 MIN: CPT

## 2021-10-21 PROCEDURE — 93000 ELECTROCARDIOGRAM COMPLETE: CPT

## 2021-10-21 NOTE — PHYSICAL EXAM
[Normal Appearance] : normal appearance [No Deformities] : no deformities [Eyelids - No Xanthelasma] : the eyelids demonstrated no xanthelasmas [No Oral Pallor] : no oral pallor [Normal Jugular Venous V Waves Present] : normal jugular venous V waves present [Respiration, Rhythm And Depth] : normal respiratory rhythm and effort [Heart Rate And Rhythm] : heart rate and rhythm were normal [Heart Sounds] : normal S1 and S2 [Edema] : no peripheral edema present [FreeTextEntry1] : 2/6 systolic ejection murmur [Bowel Sounds] : normal bowel sounds [Abdomen Soft] : soft [Abdomen Tenderness] : non-tender [Abnormal Walk] : normal gait [Petechial Hemorrhages (___cm)] : no petechial hemorrhages [] : no rash [Oriented To Time, Place, And Person] : oriented to person, place, and time

## 2021-10-21 NOTE — HISTORY OF PRESENT ILLNESS
[FreeTextEntry1] : ERNESTINE CHAVEZ is a 81 year old male with a past medical history of cardiomyopathy, LVEF 35-40% on echocardiogram August 2021 (decrease from July 2020), CAD, status post STEMI in 2010 and another MARISOL to LAD and PTCA of D1 in  2013, status post AICD at [place], hypercholesteremia, Hypertension.\par \par \par In the past year, there have been no hospitalizations or procedures. He denies chest pain, pressure, palpitations, unusual shortness of breath, orthopnea, LE edema, lightheadedness, dizziness, near syncope or syncope. Never a smoker. \par \par He has fatigue and generalized weakness.  He has lost 7 pounds in the past month (his weight fluctuates between 144 and 133 pounds over past few years)\par \par \par ** Testing:\par \par Labs 10/21/20: WBC 5.1, Hgb 13.4, HCT 40.5, plt 155, Na 142, K 5.3, Cr 1.64, Ca 9.2, AST 28, ALT 24, CK 54\par \par EKG 10/19/20: SR at 67 bpm with LAD, LAFB, Q waves lad III, aVF, V1, and V2, PRWP, nonspecific ST-T wave abnormalities, CA interval 176 ms, Qtc 412 ms \par \par Abd us 7/20/20: Mild plaque. No AAA.\par \par His echocardiogram in July 2020 showed normal EF, mild aortic stenosis.  Normal PASP.\par \par Carotids 10/11/18: Mild nonobstructive carotid dz seen BL\par \par Echocardiogram August 2021: EF 35 to 40%.  Mild aortic stenosis.  Grade 1 diastolic dysfunction.  PASP 23.\par \par Lexiscan stress test with SPECT August 2021: Ischemic cardiomyopathy.  Gated EF 31%.  Akinesis of anteroapical, apical and inferoapical walls.

## 2021-10-21 NOTE — REVIEW OF SYSTEMS
[Feeling Fatigued] : feeling fatigued [Negative] : Heme/Lymph [FreeTextEntry9] : Knee pain.  Imbalance.

## 2021-10-21 NOTE — DISCUSSION/SUMMARY
[FreeTextEntry1] : ERNESTINE CHAVEZ is a 81 year old M \par \par CAD: Abnormal EKG. Currently stable on medications. Patient on statin, aspirin and beta blockers.\par Stress test with SPECT showed ischemic cardiomyopathy, fixed defects, no ischemia.\par \par Cardiomyopathy -EF had improved and then declined on the last echo in August 2021 to now 35 to 40%. No CHF signs or symptoms. Due to high creatinine, patient unable to take ACE I or ARB. Prior complaints of postural dizziness.  Has mild aortic stenosis.  He is on trial of low-dose hydralazine -recheck LVEF in 2-3 months\par \par Status post AICD. Continue device check. No episodes of PAF or significant ventricular arrhythmias.  Device nearing ULYSSES.  Plan already in place to check battery status in few months.\par \par Hypercholesteremia, well controlled with statins\par \par Patient encouraged to stay active, CT low-level exercise. Avoid high sodium diet\par \par Ongoing DVC checks. Close monitoring of battery.\par \par Discussed findings of echocardiogram and nuclear stress test.  Discussed implication of decreasing EF and history of ischemic cardiomyopathy.  Patient mostly asymptomatic for cardiomyopathy or CHF. \par \par  He has generalized weakness and is losing weight.  He will discuss investigation of weight loss with primary care if needed.  I have asked him to take protein supplements and increase calorie intake\par \par Patient has guarded prognosis.  Risk for major cardiovascular events.\par \par \par Thank you for this referral and allowing me to participate in the care of this patient.  If I can be of any further help or  if you have any questions, please do not hesitate to contact me\par \par \par Sincerely,\par \par Arnulfo Cobb MD, FACC, ALEXANDRA\par

## 2021-12-15 ENCOUNTER — APPOINTMENT (OUTPATIENT)
Dept: CARDIOLOGY | Facility: CLINIC | Age: 81
End: 2021-12-15
Payer: MEDICARE

## 2021-12-15 VITALS
WEIGHT: 132 LBS | OXYGEN SATURATION: 95 % | TEMPERATURE: 97.8 F | SYSTOLIC BLOOD PRESSURE: 134 MMHG | BODY MASS INDEX: 18.9 KG/M2 | DIASTOLIC BLOOD PRESSURE: 60 MMHG | HEIGHT: 70 IN | HEART RATE: 63 BPM

## 2021-12-15 PROCEDURE — 93282 PRGRMG EVAL IMPLANTABLE DFB: CPT

## 2021-12-15 NOTE — PROCEDURE
[No] : not [ICD] : Implantable cardioverter-defibrillator [VVI] : VVI [Voltage: ___ volts] : Voltage was [unfilled] volts [Lead Imp:  ___ohms] : lead impedance was [unfilled] ohms [Sensing Amplitude ___mv] : sensing amplitude was [unfilled] mv [___V @] : [unfilled] V [___ ms] : [unfilled] ms [de-identified] : Medtronic [de-identified] : Donte GREEN VR [id number] [de-identified] : [id number] [de-identified] : 2- [de-identified] : 40 [de-identified] : VS 99.9%\par  0.1%\par \par No events.\par \par \par Settings\par Ventricle sensing 0.30mv\par  amplitude 2.0v (auto)\par  duration 0.40ms (auto)\par \par Detection:\par VF, On, rate 182 bpm, Therapies ATP during charging, 35J x 6\par FVT, via VF, rate 240 bpm, Therapies burse (1), 35J x 6\par VT, off, rate 167 bpm, All Rx off\par \par Patient will hit RRT at 2.63 volts. Close monitoring recommended. Declines remote monitoring. Will repeat DVC in 3 months.\par \par F/U: as above.\par Discussed red flag symptoms, which would warrant sooner or emergent medical evaluation.\par Any questions and concerns were addressed and resolved.\par \par Sincerely,\par \par Justyna Montoya PA-C\par Patients history, testing and plan reviewed with supervising MD: Dr. Сергей Braun

## 2022-01-04 ENCOUNTER — APPOINTMENT (OUTPATIENT)
Dept: CARDIOLOGY | Facility: CLINIC | Age: 82
End: 2022-01-04

## 2022-03-02 ENCOUNTER — APPOINTMENT (OUTPATIENT)
Dept: CARDIOLOGY | Facility: CLINIC | Age: 82
End: 2022-03-02
Payer: MEDICARE

## 2022-03-02 VITALS
DIASTOLIC BLOOD PRESSURE: 58 MMHG | HEART RATE: 66 BPM | TEMPERATURE: 97 F | SYSTOLIC BLOOD PRESSURE: 112 MMHG | OXYGEN SATURATION: 96 %

## 2022-03-02 PROCEDURE — 93306 TTE W/DOPPLER COMPLETE: CPT

## 2022-03-02 PROCEDURE — 93282 PRGRMG EVAL IMPLANTABLE DFB: CPT

## 2022-03-02 NOTE — PROCEDURE
[No] : not [ICD] : Implantable cardioverter-defibrillator [VVI] : VVI [Voltage: ___ volts] : Voltage was [unfilled] volts [Lead Imp:  ___ohms] : lead impedance was [unfilled] ohms [Sensing Amplitude ___mv] : sensing amplitude was [unfilled] mv [___V @] : [unfilled] V [___ ms] : [unfilled] ms [de-identified] : Medtronic [de-identified] : Donte GREEN VR [id number] [de-identified] : [id number] [de-identified] : 2- [de-identified] : 40 [de-identified] : VS 99.9%\par  0.1%\par \par No events.\par \par Settings\par Ventricle sensing 0.30mv\par  amplitude 2.0v (auto)\par  duration 0.40ms (auto)\par \par Detection:\par VF, On, rate 182 bpm, Therapies ATP during charging, 35J x 6\par FVT, via VF, rate 240 bpm, Therapies burse (1), 35J x 6\par VT, off, rate 167 bpm, All Rx off\par \par Patient will hit RRT at 2.63 volts. Close monitoring recommended. Declines remote monitoring. Will repeat DVC in 3 months.\par \par F/U: as above.\par Discussed red flag symptoms, which would warrant sooner or emergent medical evaluation.\par Any questions and concerns were addressed and resolved.\par \par Sincerely,\par \par Justyna Montoya PA-C\par Patients history, testing and plan reviewed with supervising MD: Dr. Сергей Braun

## 2022-06-08 ENCOUNTER — APPOINTMENT (OUTPATIENT)
Dept: CARDIOLOGY | Facility: CLINIC | Age: 82
End: 2022-06-08
Payer: MEDICARE

## 2022-06-08 VITALS
OXYGEN SATURATION: 98 % | DIASTOLIC BLOOD PRESSURE: 64 MMHG | WEIGHT: 130 LBS | TEMPERATURE: 97.3 F | HEIGHT: 70 IN | BODY MASS INDEX: 18.61 KG/M2 | SYSTOLIC BLOOD PRESSURE: 110 MMHG | HEART RATE: 75 BPM

## 2022-06-08 PROCEDURE — 93282 PRGRMG EVAL IMPLANTABLE DFB: CPT

## 2022-06-08 NOTE — PROCEDURE
[No] : not [ICD] : Implantable cardioverter-defibrillator [VVI] : VVI [Voltage: ___ volts] : Voltage was [unfilled] volts [Lead Imp:  ___ohms] : lead impedance was [unfilled] ohms [Sensing Amplitude ___mv] : sensing amplitude was [unfilled] mv [___V @] : [unfilled] V [___ ms] : [unfilled] ms [de-identified] : Medtronic [de-identified] : Donte GREEN VR [id number] [de-identified] : [id number] [de-identified] : 2- [de-identified] : 40 [de-identified] : VS 99.9%\par  0.1%\par \par No events.\par \par Settings\par Ventricle sensing 0.30mv\par  amplitude 2.0v (auto)\par  duration 0.40ms (auto)\par \par Detection:\par VF, On, rate 182 bpm, Therapies ATP during charging, 35J x 6\par FVT, via VF, rate 240 bpm, Therapies burse (1), 35J x 6\par VT, off, rate 167 bpm, All Rx off\par \par Patient will hit RRT at 2.63 volts. Close monitoring recommended. Declines remote monitoring. Will repeat DVC in 1 month.\par Office visit tomorrow with Dr. Cobb\par \par Discussed red flag symptoms, which would warrant sooner or emergent medical evaluation.\par Any questions and concerns were addressed and resolved.\par \par Sincerely,\par \par Justyna Montoya PA-C\par Patients history, testing and plan reviewed with supervising MD: Dr. Сергей Braun

## 2022-06-09 ENCOUNTER — APPOINTMENT (OUTPATIENT)
Dept: CARDIOLOGY | Facility: CLINIC | Age: 82
End: 2022-06-09
Payer: MEDICARE

## 2022-06-09 VITALS
RESPIRATION RATE: 14 BRPM | TEMPERATURE: 98 F | WEIGHT: 130 LBS | DIASTOLIC BLOOD PRESSURE: 58 MMHG | HEIGHT: 70 IN | BODY MASS INDEX: 18.61 KG/M2 | HEART RATE: 68 BPM | SYSTOLIC BLOOD PRESSURE: 112 MMHG | OXYGEN SATURATION: 100 %

## 2022-06-09 PROCEDURE — 99215 OFFICE O/P EST HI 40 MIN: CPT

## 2022-06-09 RX ORDER — BUDESONIDE AND FORMOTEROL FUMARATE DIHYDRATE 80; 4.5 UG/1; UG/1
80-4.5 AEROSOL RESPIRATORY (INHALATION)
Qty: 10 | Refills: 0 | Status: DISCONTINUED | COMMUNITY
Start: 2020-09-15 | End: 2022-06-09

## 2022-06-09 RX ORDER — BUDESONIDE AND FORMOTEROL FUMARATE DIHYDRATE 80; 4.5 UG/1; UG/1
80-4.5 AEROSOL RESPIRATORY (INHALATION) TWICE DAILY
Refills: 0 | Status: ACTIVE | COMMUNITY

## 2022-06-09 RX ORDER — HYDRALAZINE HYDROCHLORIDE 25 MG/1
25 TABLET ORAL TWICE DAILY
Qty: 180 | Refills: 1 | Status: DISCONTINUED | COMMUNITY
Start: 2021-09-02 | End: 2022-06-09

## 2022-06-09 NOTE — PHYSICAL EXAM
[Normal Appearance] : normal appearance [No Deformities] : no deformities [Eyelids - No Xanthelasma] : the eyelids demonstrated no xanthelasmas [No Oral Pallor] : no oral pallor [Normal Jugular Venous V Waves Present] : normal jugular venous V waves present [Respiration, Rhythm And Depth] : normal respiratory rhythm and effort [Heart Rate And Rhythm] : heart rate and rhythm were normal [Heart Sounds] : normal S1 and S2 [Edema] : no peripheral edema present [Bowel Sounds] : normal bowel sounds [Abdomen Soft] : soft [Abdomen Tenderness] : non-tender [Abnormal Walk] : normal gait [Petechial Hemorrhages (___cm)] : no petechial hemorrhages [] : no rash [Oriented To Time, Place, And Person] : oriented to person, place, and time [FreeTextEntry1] : 2/6 systolic ejection murmur

## 2022-06-09 NOTE — DISCUSSION/SUMMARY
[FreeTextEntry1] : ERNESTINE CHAVEZ is a 81 year old M \par \par CAD: Abnormal EKG. Currently stable on medications. Patient on statin, aspirin and beta blockers.\par Stress test with SPECT showed ischemic cardiomyopathy, fixed defects, no ischemia.\par \par Cardiomyopathy -EF had improved and then declined on the last echo in August 2021 to now 35 to 40%. No CHF signs or symptoms. Due to high creatinine, patient unable to take ACE I or ARB. Prior complaints of postural dizziness.  Has mild aortic stenosis.  He is on low-dose hydralazine - LVEF was rechecked in March 2022 showed no change in EF.  Patient mostly asymptomatic for cardiomyopathy or CHF.  He was not taking hydralazine as prescribed.  He was running short in pills.  We will recheck limited EF in 6 months.\par \par Status post AICD. Continue device check. No episodes of PAF or significant ventricular arrhythmias.  Device nearing ULYSSES.  Plan already in place to check battery status in 1 months.\par \par Hypercholesteremia, well controlled with statins\par \par Patient encouraged to stay active, CT low-level exercise. Avoid high sodium diet\par \par Ongoing DVC checks. Close monitoring of battery.\par \par \par \par  He has generalized weakness and was losing weight.  The weight has been stabilized now.\par \par Patient has guarded prognosis.  Risk for major cardiovascular events.\par \par \par Thank you for this referral and allowing me to participate in the care of this patient.  If I can be of any further help or  if you have any questions, please do not hesitate to contact me\par \par \par Sincerely,\par \par Arnulfo Cobb MD, FACC, ALEXANDRA\par

## 2022-06-09 NOTE — HISTORY OF PRESENT ILLNESS
[FreeTextEntry1] : ERNESTINE CHAVEZ is a 81 year old male with a past medical history of cardiomyopathy, LVEF 35-40% on echocardiogram August 2021 (decrease from July 2020), CAD, status post STEMI in 2010 and another MARISOL to LAD and PTCA of D1 in  2013, status post AICD at [place], hypercholesteremia, Hypertension.\par \par In the past year, there have been no hospitalizations or procedures. He denies chest pain, pressure, palpitations, unusual shortness of breath, orthopnea, LE edema, lightheadedness, dizziness, near syncope or syncope. Never a smoker. \par \par He has fatigue and generalized weakness.  He has lost 7 pounds in the past month (his weight fluctuates between 144 and 133 pounds over past few years)\par \par \par ** Testing:\par \par Labs 10/21/20: WBC 5.1, Hgb 13.4, HCT 40.5, plt 155, Na 142, K 5.3, Cr 1.64, Ca 9.2, AST 28, ALT 24, CK 54\par \par EKG 10/19/20: SR at 67 bpm with LAD, LAFB, Q waves lad III, aVF, V1, and V2, PRWP, nonspecific ST-T wave abnormalities, WA interval 176 ms, Qtc 412 ms \par \par Abd us 7/20/20: Mild plaque. No AAA.\par \par His echocardiogram in July 2020 showed normal EF, mild aortic stenosis.  Normal PASP.\par \par Carotids 10/11/18: Mild nonobstructive carotid dz seen BL\par \par Echocardiogram August 2021: EF 35 to 40%.  Mild aortic stenosis.  Grade 1 diastolic dysfunction.  PASP 23.\par \par Lexiscan stress test with SPECT August 2021: Ischemic cardiomyopathy.  Gated EF 31%.  Akinesis of anteroapical, apical and inferoapical walls.\par \par AICD check was done on 6/8/2022.  No events.\par \par Echo March 2022: EF 36%.  PASP 22.  Akinesis of mid to distal septum, distal inferior wall and apex.\par

## 2022-07-25 ENCOUNTER — APPOINTMENT (OUTPATIENT)
Dept: CARDIOLOGY | Facility: CLINIC | Age: 82
End: 2022-07-25

## 2022-07-25 VITALS
BODY MASS INDEX: 25.52 KG/M2 | HEART RATE: 68 BPM | TEMPERATURE: 98.3 F | SYSTOLIC BLOOD PRESSURE: 118 MMHG | HEIGHT: 60 IN | WEIGHT: 130 LBS | OXYGEN SATURATION: 98 % | DIASTOLIC BLOOD PRESSURE: 80 MMHG

## 2022-07-25 PROCEDURE — 93282 PRGRMG EVAL IMPLANTABLE DFB: CPT

## 2022-07-25 NOTE — PROCEDURE
[No] : not [ICD] : Implantable cardioverter-defibrillator [VVI] : VVI [Voltage: ___ volts] : Voltage was [unfilled] volts [Lead Imp:  ___ohms] : lead impedance was [unfilled] ohms [Sensing Amplitude ___mv] : sensing amplitude was [unfilled] mv [___V @] : [unfilled] V [___ ms] : [unfilled] ms [de-identified] : Medtronic [de-identified] : Donte GREEN VR [id number] [de-identified] : [id number] [de-identified] : 2- [de-identified] : 40 [de-identified] : VS 99.9%\par  0.1%\par \par No events.\par \par Settings\par Ventricle sensing 0.30mv\par  amplitude 2.0v (auto)\par  duration 0.40ms (auto)\par \par Detection:\par VF, On, rate 182 bpm, Therapies ATP during charging, 35J x 6\par FVT, via VF, rate 240 bpm, Therapies burse (1), 35J x 6\par VT, off, rate 167 bpm, All Rx off\par \par Battery at RRT - 2.63V\par Recommend schedule AICD generator change at [facility]\par \par Discussed red flag symptoms, which would warrant sooner or emergent medical evaluation.\par Any questions and concerns were addressed and resolved.\par \par Sincerely,\par \par Justyna Montoya PA-C\par Patients history, testing and plan reviewed with supervising MD: Dr. Arnulfo Cobb

## 2022-07-29 ENCOUNTER — NON-APPOINTMENT (OUTPATIENT)
Age: 82
End: 2022-07-29

## 2022-08-05 ENCOUNTER — NON-APPOINTMENT (OUTPATIENT)
Age: 82
End: 2022-08-05

## 2022-08-23 ENCOUNTER — NON-APPOINTMENT (OUTPATIENT)
Age: 82
End: 2022-08-23

## 2022-09-02 ENCOUNTER — OUTPATIENT (OUTPATIENT)
Dept: OUTPATIENT SERVICES | Facility: HOSPITAL | Age: 82
LOS: 1 days | End: 2022-09-02

## 2022-09-07 ENCOUNTER — OUTPATIENT (OUTPATIENT)
Dept: OUTPATIENT SERVICES | Facility: HOSPITAL | Age: 82
LOS: 1 days | Discharge: HOME | End: 2022-09-07

## 2022-09-07 PROCEDURE — 33262 RMVL& REPLC PULSE GEN 1 LEAD: CPT

## 2022-09-07 PROCEDURE — 88300 SURGICAL PATH GROSS: CPT | Mod: 26

## 2022-09-14 ENCOUNTER — APPOINTMENT (OUTPATIENT)
Dept: CARDIOLOGY | Facility: CLINIC | Age: 82
End: 2022-09-14

## 2022-09-14 VITALS
OXYGEN SATURATION: 96 % | TEMPERATURE: 97.5 F | BODY MASS INDEX: 26.11 KG/M2 | WEIGHT: 133 LBS | HEIGHT: 60 IN | SYSTOLIC BLOOD PRESSURE: 126 MMHG | HEART RATE: 73 BPM | DIASTOLIC BLOOD PRESSURE: 66 MMHG

## 2022-09-14 DIAGNOSIS — I10 ESSENTIAL (PRIMARY) HYPERTENSION: ICD-10-CM

## 2022-09-14 DIAGNOSIS — I25.5 ISCHEMIC CARDIOMYOPATHY: ICD-10-CM

## 2022-09-14 DIAGNOSIS — I25.2 OLD MYOCARDIAL INFARCTION: ICD-10-CM

## 2022-09-14 DIAGNOSIS — Z95.810 PRESENCE OF AUTOMATIC (IMPLANTABLE) CARDIAC DEFIBRILLATOR: ICD-10-CM

## 2022-09-14 DIAGNOSIS — Z45.02 ENCOUNTER FOR ADJUSTMENT AND MANAGEMENT OF AUTOMATIC IMPLANTABLE CARDIAC DEFIBRILLATOR: ICD-10-CM

## 2022-09-14 DIAGNOSIS — J45.909 UNSPECIFIED ASTHMA, UNCOMPLICATED: ICD-10-CM

## 2022-09-14 DIAGNOSIS — Z01.812 ENCOUNTER FOR PREPROCEDURAL LABORATORY EXAMINATION: ICD-10-CM

## 2022-09-14 DIAGNOSIS — I25.10 ATHEROSCLEROTIC HEART DISEASE OF NATIVE CORONARY ARTERY WITHOUT ANGINA PECTORIS: ICD-10-CM

## 2022-09-14 PROCEDURE — 99024 POSTOP FOLLOW-UP VISIT: CPT

## 2022-09-14 PROCEDURE — 93282 PRGRMG EVAL IMPLANTABLE DFB: CPT

## 2022-12-01 ENCOUNTER — APPOINTMENT (OUTPATIENT)
Dept: CARDIOLOGY | Facility: CLINIC | Age: 82
End: 2022-12-01

## 2022-12-01 VITALS
SYSTOLIC BLOOD PRESSURE: 108 MMHG | BODY MASS INDEX: 19.76 KG/M2 | HEIGHT: 70 IN | DIASTOLIC BLOOD PRESSURE: 52 MMHG | TEMPERATURE: 98.2 F | WEIGHT: 138 LBS

## 2022-12-01 PROCEDURE — 93306 TTE W/DOPPLER COMPLETE: CPT

## 2022-12-01 PROCEDURE — 99214 OFFICE O/P EST MOD 30 MIN: CPT

## 2022-12-01 NOTE — DISCUSSION/SUMMARY
[FreeTextEntry1] : ERNESTINE CHAVEZ is a 82 year old M \par \par CAD: Abnormal EKG. Currently stable on medications. Patient on statin, aspirin and beta blockers.\par Stress test with SPECT showed ischemic cardiomyopathy, fixed defects, no ischemia.\par \par Cardiomyopathy -EF had improved and then declined on the last echo in August 2021 to now 35 to 40%.  No CHF signs or symptoms. Due to high creatinine, patient unable to take ACE I or ARB. Prior complaints of postural dizziness.  Has mild aortic stenosis.  He is on low-dose hydralazine - LVEF was rechecked in March 2022 showed no change in EF and again today EF 35%.  Patient mostly asymptomatic for cardiomyopathy or CHF.  \par He was not taking hydralazine as prescribed previously and now states he was takin them BID.  \par -Add Farxiga 10mg. Repeat labs in 2-4 weeks.  We will recheck limited EF in 6 months.\par \par Status post AICD. Continue device check. No episodes of PAF or significant ventricular arrhythmias.  S/P ICD generation change 9/2022. Pt would like to come in for ICD checks\par \par Hypercholesteremia, well controlled with statins\par \par Patient encouraged to stay active, Cont low-level exercise. Avoid high sodium diet\par \par Ongoing DVC checks. \par \par He has generalized weakness and was losing weight.  The weight has been stabilized now.\par \par Patient has guarded prognosis.  Risk for major cardiovascular events.\par \par Routine 6 month follow up or sooner if needed \par Discussed red flag symptoms, which would warrant sooner or emergent medical evaluation.\par Any questions and concerns were addressed and resolved. \par \par Sincerely,\par \par Lazara Fernando ANP-C\par Patients history, testing, and plan reviewed with supervising MD: Dr. Arnulfo Cobb MD, [facility], [facility]

## 2022-12-01 NOTE — PHYSICAL EXAM
[Normal S1, S2] : normal S1, S2 [Normal] : clear lung fields, good air entry, no respiratory distress [No Edema] : no edema [Alert and Oriented] : alert and oriented [de-identified] : 2/6 systolic ejection murmur

## 2022-12-01 NOTE — REVIEW OF SYSTEMS
[Feeling Fatigued] : feeling fatigued [Negative] : Heme/Lymph [Weakness] : weakness [FreeTextEntry9] : Knee pain.  Imbalance.

## 2022-12-01 NOTE — HISTORY OF PRESENT ILLNESS
[FreeTextEntry1] : ERNESTINE CHAVEZ is a 82 year old male with a past medical history of cardiomyopathy, LVEF 35-40% on echocardiogram August 2021 (decrease from July 2020), CAD, status post STEMI in 2010 and another MARISOL to LAD and PTCA of D1 in  2013, status post AICD at [place], hypercholesteremia, Hypertension.\par \par He had  ICD generator change 9/7/2022.  He denies chest pain, pressure, palpitations, unusual shortness of breath, orthopnea, LE edema, lightheadedness, dizziness, near syncope or syncope. Never a smoker. He does not exercise however states he is able to walk up stairs slowly without any exertional complaints\par \par He has fatigue and generalized weakness which has remained stable.   (his weight fluctuates between 144 and 133 pounds over past few years)\par \par ** Testing:\par Labs 10/21/20: WBC 5.1, Hgb 13.4, HCT 40.5, plt 155, Na 142, K 5.3, Cr 1.64, Ca 9.2, AST 28, ALT 24, CK 54\par \par EKG 10/19/20: SR at 67 bpm with LAD, LAFB, Q waves lad III, aVF, V1, and V2, PRWP, nonspecific ST-T wave abnormalities, WV interval 176 ms, Qtc 412 ms \par \par Abd us 7/20/20: Mild plaque. No AAA.\par \par His echocardiogram in July 2020 showed normal EF, mild aortic stenosis.  Normal PASP.\par \par Carotids 10/11/18: Mild nonobstructive carotid dz seen BL\par \par Echocardiogram August 2021: EF 35 to 40%.  Mild aortic stenosis.  Grade 1 diastolic dysfunction.  PASP 23.\par \par Lexiscan stress test with SPECT August 2021: Ischemic cardiomyopathy.  Gated EF 31%.  Akinesis of anteroapical, apical and inferoapical walls.\par \par AICD check was done on 6/8/2022.  No events.\par \par Labs 4/2022:  HGb 12.3 HCT 38.6 TSH 1.52 Total Chol 138 LDL 65 Creat 1.61 K+ 4.8 ASt 25 Alt 19 \par Echo March 2022: EF 36%.  PASP 22.  Akinesis of mid to distal septum, distal inferior wall and apex.\par Limited ECHO 12/1/2022: EF 35%\par

## 2022-12-15 ENCOUNTER — APPOINTMENT (OUTPATIENT)
Dept: CARDIOLOGY | Facility: CLINIC | Age: 82
End: 2022-12-15

## 2023-01-09 ENCOUNTER — APPOINTMENT (OUTPATIENT)
Dept: CARDIOLOGY | Facility: CLINIC | Age: 83
End: 2023-01-09
Payer: MEDICARE

## 2023-01-09 VITALS
SYSTOLIC BLOOD PRESSURE: 102 MMHG | BODY MASS INDEX: 18.9 KG/M2 | WEIGHT: 132 LBS | DIASTOLIC BLOOD PRESSURE: 62 MMHG | OXYGEN SATURATION: 95 % | HEART RATE: 69 BPM | HEIGHT: 70 IN

## 2023-01-09 PROCEDURE — 93282 PRGRMG EVAL IMPLANTABLE DFB: CPT

## 2023-01-09 RX ORDER — HYDRALAZINE HYDROCHLORIDE 25 MG/1
25 TABLET ORAL TWICE DAILY
Qty: 180 | Refills: 3 | Status: DISCONTINUED | COMMUNITY
End: 2023-01-09

## 2023-01-09 NOTE — PROCEDURE
[ICD] : Implantable cardioverter-defibrillator [VVI] : VVI [Lead Imp:  ___ohms] : lead impedance was [unfilled] ohms [Sensing Amplitude ___mv] : sensing amplitude was [unfilled] mv [___V @] : [unfilled] V [___ ms] : [unfilled] ms [de-identified] : MARY LOU [de-identified] : Cobalt [de-identified] : [id number] [de-identified] : 9/7/2022 [de-identified] : 40 [de-identified] : Vpaced <1% \par \par Settings \par Ventricle\par Sensing 0.30mv \par Amplitude 2.0V\par Duration 0.4ms\par \par Therapy \par -240bpm burst, 40J x's 5\par VF >182bpm burst, 40J x's 6\par \par No new recorded events\par Patient declines home monitoring\par AICD interrogation in 4 months\par \par Sincerely,\par \par Justyna Montoya PA-C\par Patients history, testing and plan reviewed with supervising MD: Dr. Сергей Braun

## 2023-03-01 ENCOUNTER — APPOINTMENT (OUTPATIENT)
Dept: CARDIOLOGY | Facility: CLINIC | Age: 83
End: 2023-03-01

## 2023-03-02 ENCOUNTER — NON-APPOINTMENT (OUTPATIENT)
Age: 83
End: 2023-03-02

## 2023-03-16 ENCOUNTER — APPOINTMENT (OUTPATIENT)
Dept: CARDIOLOGY | Facility: CLINIC | Age: 83
End: 2023-03-16

## 2023-04-04 ENCOUNTER — APPOINTMENT (OUTPATIENT)
Dept: CARDIOLOGY | Facility: CLINIC | Age: 83
End: 2023-04-04

## 2023-05-08 ENCOUNTER — APPOINTMENT (OUTPATIENT)
Dept: CARDIOLOGY | Facility: CLINIC | Age: 83
End: 2023-05-08
Payer: MEDICARE

## 2023-05-08 VITALS
HEIGHT: 70 IN | SYSTOLIC BLOOD PRESSURE: 106 MMHG | BODY MASS INDEX: 18.61 KG/M2 | HEART RATE: 68 BPM | DIASTOLIC BLOOD PRESSURE: 62 MMHG | WEIGHT: 130 LBS

## 2023-05-08 PROCEDURE — 93283 PRGRMG EVAL IMPLANTABLE DFB: CPT

## 2023-05-10 NOTE — PROCEDURE
[ICD] : Implantable cardioverter-defibrillator [VVI] : VVI [Lead Imp:  ___ohms] : lead impedance was [unfilled] ohms [Sensing Amplitude ___mv] : sensing amplitude was [unfilled] mv [___V @] : [unfilled] V [___ ms] : [unfilled] ms [de-identified] : MARY LOU [de-identified] : Cobalt [de-identified] : [id number] [de-identified] : 9/7/2022 [de-identified] : 40 [de-identified] : Vpaced <1% \par \par Settings \par Ventricle\par Sensing 0.30mv \par Amplitude 2.0V\par Duration 0.4ms\par \par Therapy \par -240bpm burst, 40J x's 5\par VF >182bpm burst, 40J x's 6\par \par No new recorded events\par Patient declines home monitoring\par AICD interrogation in 4 months\par \par Sincerely,\par \par Justyna Montoya PA-C\par Patients history, testing and plan reviewed with supervising MD: Dr. Сергей Braun

## 2023-06-01 ENCOUNTER — APPOINTMENT (OUTPATIENT)
Dept: CARDIOLOGY | Facility: CLINIC | Age: 83
End: 2023-06-01
Payer: MEDICARE

## 2023-06-01 VITALS
OXYGEN SATURATION: 97 % | RESPIRATION RATE: 14 BRPM | BODY MASS INDEX: 18.75 KG/M2 | WEIGHT: 131 LBS | DIASTOLIC BLOOD PRESSURE: 64 MMHG | HEIGHT: 70 IN | HEART RATE: 72 BPM | SYSTOLIC BLOOD PRESSURE: 106 MMHG

## 2023-06-01 DIAGNOSIS — E78.00 PURE HYPERCHOLESTEROLEMIA, UNSPECIFIED: ICD-10-CM

## 2023-06-01 PROCEDURE — 99215 OFFICE O/P EST HI 40 MIN: CPT

## 2023-06-01 PROCEDURE — 93308 TTE F-UP OR LMTD: CPT

## 2023-06-01 NOTE — DISCUSSION/SUMMARY
[FreeTextEntry1] : ERNESTINE CHAVEZ is a 82 year old M \par \par CAD: Abnormal EKG. Currently stable on medications. Patient on statin, aspirin and beta blockers.\par Stress test with SPECT showed ischemic cardiomyopathy, fixed defects, no ischemia.\par \par Cardiomyopathy -EF had improved and then declined on the last echo in August 2021 to now 35 to 40%.  No CHF signs or symptoms. Due to high creatinine, patient unable to take ACE I or ARB. Prior complaints of postural dizziness.  Has mild aortic stenosis.  He was on low-dose hydralazine.  It was DC'd however the patient does not remember by who.  Perhaps it was secondary to low blood pressure\par \par LVEF was rechecked in March 2022 showed no change in EF; however today, on echo, there might be a slight improvement to EF around 38-40%.  There is persistent apical dyskinesis and large distal portion of LV is akinetic ; patient mostly asymptomatic for cardiomyopathy or CHF.  \par \par Previously added Farxiga 10mg.  Follow-up labs in April 2022 showed creatinine 1.6, normal potassium and glucose.  LDL 65.  Patient seems to be tolerating Farxiga.\par \par Status post AICD. Continue device check. No episodes of PAF or significant ventricular arrhythmias.  S/P ICD generation change 9/2022. Pt would like to come in for ICD checks\par \par Hypercholesteremia, well controlled with statins\par \par Patient encouraged to stay active, Cont low-level exercise. Avoid high sodium diet\par \par Patient has guarded prognosis.  Risk for major cardiovascular events.\par \par Follow-up with CHF team.  Check NT proBNP.  Check 6-minute walk test.\par \par Thank you for this referral and allowing me to participate in the care of this patient.  If I can be of any further help or  if you have any questions, please do not hesitate to contact me\par \par \par Sincerely,\par \par Arnulfo Cobb MD, FACC, ALEXANDRA

## 2023-06-01 NOTE — HISTORY OF PRESENT ILLNESS
[FreeTextEntry1] : ERNESTINE CHAVEZ is a 82 year old male with a past medical history of cardiomyopathy, LVEF 35-40% on echocardiogram August 2021 (decrease from July 2020), CAD, status post STEMI in 2010 and another MARISOL to LAD and PTCA of D1 in  2013, status post AICD at [facility], hypercholesteremia, Hypertension.\par \par He had  ICD generator change 9/7/2022.  He denies chest pain, pressure, palpitations, unusual shortness of breath, orthopnea, LE edema, lightheadedness, dizziness, near syncope or syncope. Never a smoker. He does not exercise however states he is able to walk up stairs slowly without any exertional complaints\par \par He has fatigue and generalized weakness which has remained stable.  \par \par ** Testing:\par Labs 10/21/20: WBC 5.1, Hgb 13.4, HCT 40.5, plt 155, Na 142, K 5.3, Cr 1.64, Ca 9.2, AST 28, ALT 24, CK 54\par \par EKG 10/19/20: SR at 67 bpm with LAD, LAFB, Q waves lad III, aVF, V1, and V2, PRWP, nonspecific ST-T wave abnormalities, W[place] interval 176 ms, Qtc 412 ms \par \par Abd us 7/20/20: Mild plaque. No AAA.\par \par His echocardiogram in July 2020 showed normal EF, mild aortic stenosis.  Normal PASP.\par \par Carotids 10/11/18: Mild nonobstructive carotid dz seen BL\par \par Echocardiogram August 2021: EF 35 to 40%.  Mild aortic stenosis.  Grade 1 diastolic dysfunction.  PASP 23.\par \par Lexiscan stress test with SPECT August 2021: Ischemic cardiomyopathy.  Gated EF 31%.  Akinesis of anteroapical, apical and inferoapical walls.\par \par AICD check was done on 6/8/2022 and May 2023 - no events.\par \par Labs 4/2022:  HGb 12.3 HCT 38.6 TSH 1.52 Total Chol 138 LDL 65 Creat 1.61 K+ 4.8 ASt 25 Alt 19 \par Echo March 2022: EF 36%.  PASP 22.  Akinesis of mid to distal septum, distal inferior wall and apex.\par Limited ECHO 12/1/2022: EF 35%\par

## 2023-06-01 NOTE — REVIEW OF SYSTEMS
[Feeling Fatigued] : feeling fatigued [Weakness] : weakness [Negative] : Heme/Lymph [FreeTextEntry9] : Knee pain.  Imbalance.

## 2023-06-01 NOTE — PHYSICAL EXAM
[Normal S1, S2] : normal S1, S2 [Normal] : clear lung fields, good air entry, no respiratory distress [No Edema] : no edema [Alert and Oriented] : alert and oriented [de-identified] : 2/6 systolic ejection murmur

## 2023-06-19 ENCOUNTER — APPOINTMENT (OUTPATIENT)
Dept: CARDIOLOGY | Facility: CLINIC | Age: 83
End: 2023-06-19

## 2023-06-20 ENCOUNTER — APPOINTMENT (OUTPATIENT)
Dept: CARDIOLOGY | Facility: CLINIC | Age: 83
End: 2023-06-20
Payer: MEDICARE

## 2023-06-20 VITALS
HEART RATE: 67 BPM | DIASTOLIC BLOOD PRESSURE: 66 MMHG | WEIGHT: 132 LBS | OXYGEN SATURATION: 97 % | HEIGHT: 70 IN | SYSTOLIC BLOOD PRESSURE: 124 MMHG | BODY MASS INDEX: 18.9 KG/M2

## 2023-06-20 DIAGNOSIS — I10 ESSENTIAL (PRIMARY) HYPERTENSION: ICD-10-CM

## 2023-06-20 DIAGNOSIS — I25.10 ATHEROSCLEROTIC HEART DISEASE OF NATIVE CORONARY ARTERY W/OUT ANGINA PECTORIS: ICD-10-CM

## 2023-06-20 PROCEDURE — 99214 OFFICE O/P EST MOD 30 MIN: CPT | Mod: 25

## 2023-06-20 PROCEDURE — 94618 PULMONARY STRESS TESTING: CPT

## 2023-06-20 RX ORDER — DAPAGLIFLOZIN 10 MG/1
10 TABLET, FILM COATED ORAL DAILY
Qty: 90 | Refills: 2 | Status: DISCONTINUED | COMMUNITY
Start: 2022-12-01 | End: 2023-06-20

## 2023-06-20 NOTE — HISTORY OF PRESENT ILLNESS
[FreeTextEntry1] : \par 83 y/o male with HFrEF (LVEF 35-40%, ICMP, CAD s/p MI and PCI to LAD and D1, s/p AICD, HTN, HLD presents to HF clinic today to be evaluated for Barostim device for further HF management. Medical therapy has been limited due to renal function.\par \par Labs 10/21/20: WBC 5.1, Hgb 13.4, HCT 40.5, plt 155, Na 142, K 5.3, Cr 1.64, Ca 9.2, AST 28, ALT 24, CK 54\par \par EKG 10/19/20: SR at 67 bpm with LAD, LAFB, Q waves lad III, aVF, V1, and V2, PRWP, nonspecific ST-T wave abnormalities, SD interval 176 ms, Qtc 412 ms \par \par Abd us 7/20/20: Mild plaque. No AAA.\par \par His echocardiogram in July 2020 showed normal EF, mild aortic stenosis. Normal PASP.\par \par Carotids 10/11/18: Mild nonobstructive carotid dz seen BL\par \par Echocardiogram August 2021: EF 35 to 40%. Mild aortic stenosis. Grade 1 diastolic dysfunction. PASP 23.\par \par Lexiscan stress test with SPECT August 2021: Ischemic cardiomyopathy. Gated EF 31%. Akinesis of anteroapical, apical and inferoapical walls.\par \par AICD check was done on 6/8/2022 and May 2023 - no events.\par \par Labs 4/2022: HGb 12.3 HCT 38.6 TSH 1.52 Total Chol 138 LDL 65 Creat 1.61 K+ 4.8 ASt 25 Alt 19 \par Echo March 2022: EF 36%. PASP 22. Akinesis of mid to distal septum, distal inferior wall and apex.\par Limited ECHO 12/1/2022: EF 35%\par \par

## 2023-06-20 NOTE — PHYSICAL EXAM
[No Acute Distress] : no acute distress [Frail] : frail [Normal Venous Pressure] : normal venous pressure [Normal S1, S2] : normal S1, S2 [No Murmur] : no murmur [Clear Lung Fields] : clear lung fields [No Respiratory Distress] : no respiratory distress  [Soft] : abdomen soft [Non Tender] : non-tender [Normal Gait] : normal gait [No Edema] : no edema [No Rash] : no rash [Moves all extremities] : moves all extremities [Alert and Oriented] : alert and oriented [de-identified] : chronic LE venous stasis changes bilaterally

## 2023-06-20 NOTE — ADDENDUM
[FreeTextEntry1] : Please note the patient was reviewed with the NP.\par I was physically present during the service of the patient\par I was directly involved in the management plan and recommendations of care provided to the patient.\par I personally reviewed the history and physical exam and plan as documented by the NP above.\par \par Dr. Corbin Maharaj \par 6/20/23

## 2023-06-20 NOTE — ASSESSMENT
[FreeTextEntry1] : \par \par 83 y/o male with HFrEF (LVEF 35-40%, ICMP, CAD s/p MI and PCI to LAD and D1, s/p AICD, HTN, HLD presents to HF clinic today to be evaluated for Barostim device for further HF management in setting of fatigue. Medical therapy has been limited due to renal function. Pt completed 6 MWT in the office today, he ambulated 179 meters. He feels well overall and is not interested in invasive therapies at this time.\par \par Continue medical therapy\par Will obtain CMP, NT proBNP via [facility] for further evaluation of HF\par F/u with Dr. Cobb in 3-4 months or sooner if clinical change\par

## 2023-06-21 ENCOUNTER — NON-APPOINTMENT (OUTPATIENT)
Age: 83
End: 2023-06-21

## 2023-09-11 ENCOUNTER — APPOINTMENT (OUTPATIENT)
Dept: CARDIOLOGY | Facility: CLINIC | Age: 83
End: 2023-09-11

## 2023-09-20 ENCOUNTER — APPOINTMENT (OUTPATIENT)
Dept: CARDIOLOGY | Facility: CLINIC | Age: 83
End: 2023-09-20

## 2023-10-02 ENCOUNTER — APPOINTMENT (OUTPATIENT)
Dept: CARDIOLOGY | Facility: CLINIC | Age: 83
End: 2023-10-02
Payer: MEDICARE

## 2023-10-02 VITALS
DIASTOLIC BLOOD PRESSURE: 66 MMHG | HEART RATE: 66 BPM | SYSTOLIC BLOOD PRESSURE: 116 MMHG | OXYGEN SATURATION: 100 % | BODY MASS INDEX: 18.65 KG/M2 | WEIGHT: 130 LBS

## 2023-10-02 DIAGNOSIS — Z95.810 PRESENCE OF AUTOMATIC (IMPLANTABLE) CARDIAC DEFIBRILLATOR: ICD-10-CM

## 2023-10-02 DIAGNOSIS — I42.9 CARDIOMYOPATHY, UNSPECIFIED: ICD-10-CM

## 2023-10-02 PROCEDURE — 93282 PRGRMG EVAL IMPLANTABLE DFB: CPT

## 2024-03-04 ENCOUNTER — APPOINTMENT (OUTPATIENT)
Dept: CARDIOLOGY | Facility: CLINIC | Age: 84
End: 2024-03-04